# Patient Record
Sex: FEMALE | Race: WHITE | NOT HISPANIC OR LATINO
[De-identification: names, ages, dates, MRNs, and addresses within clinical notes are randomized per-mention and may not be internally consistent; named-entity substitution may affect disease eponyms.]

---

## 2019-04-24 ENCOUNTER — RX ONLY (RX ONLY)
Age: 84
End: 2019-04-24

## 2019-10-02 ENCOUNTER — APPOINTMENT (OUTPATIENT)
Age: 84
Setting detail: DERMATOLOGY
End: 2019-10-02

## 2019-10-02 VITALS
HEIGHT: 59 IN | WEIGHT: 112 LBS | HEART RATE: 64 BPM | DIASTOLIC BLOOD PRESSURE: 70 MMHG | SYSTOLIC BLOOD PRESSURE: 113 MMHG

## 2019-10-02 DIAGNOSIS — L20.89 OTHER ATOPIC DERMATITIS: ICD-10-CM

## 2019-10-02 PROCEDURE — OTHER COUNSELING: OTHER

## 2019-10-02 PROCEDURE — OTHER PRESCRIPTION MEDICATION MANAGEMENT: OTHER

## 2019-10-02 PROCEDURE — OTHER PLAN FOR BMI MANAGEMENT: OTHER

## 2019-10-02 PROCEDURE — OTHER MIPS QUALITY: OTHER

## 2019-10-02 PROCEDURE — 99214 OFFICE O/P EST MOD 30 MIN: CPT

## 2019-10-02 PROCEDURE — OTHER PRESCRIPTION: OTHER

## 2019-10-02 RX ORDER — HYDROCORTISONE 25 MG/G
CREAM TOPICAL
Qty: 1 | Refills: 2 | Status: ERX | COMMUNITY
Start: 2019-10-02

## 2019-10-02 RX ORDER — BETAMETHASONE DIPROPIONATE 0.5 MG/G
CREAM TOPICAL
Qty: 1 | Refills: 1 | Status: ERX

## 2019-10-02 ASSESSMENT — LOCATION SIMPLE DESCRIPTION DERM
LOCATION SIMPLE: LEFT INDEX FINGER
LOCATION SIMPLE: RIGHT POPLITEAL SKIN
LOCATION SIMPLE: RIGHT POSTERIOR UPPER ARM
LOCATION SIMPLE: RIGHT FOOT
LOCATION SIMPLE: LEFT FOOT
LOCATION SIMPLE: RIGHT CALF
LOCATION SIMPLE: LEFT HAND
LOCATION SIMPLE: RIGHT BREAST
LOCATION SIMPLE: RIGHT THIGH
LOCATION SIMPLE: LEFT THIGH
LOCATION SIMPLE: RIGHT KNEE
LOCATION SIMPLE: RIGHT HAND
LOCATION SIMPLE: LEFT MIDDLE FINGER

## 2019-10-02 ASSESSMENT — LOCATION DETAILED DESCRIPTION DERM
LOCATION DETAILED: RIGHT PROXIMAL POSTERIOR UPPER ARM
LOCATION DETAILED: RIGHT ANTERIOR DISTAL THIGH
LOCATION DETAILED: LEFT ANTERIOR DISTAL THIGH
LOCATION DETAILED: RIGHT PROXIMAL LATERAL CALF
LOCATION DETAILED: 2ND WEB SPACE RIGHT HAND
LOCATION DETAILED: RIGHT DORSAL FOOT
LOCATION DETAILED: RIGHT AREOLA
LOCATION DETAILED: RIGHT LATERAL POPLITEAL SKIN
LOCATION DETAILED: LEFT RADIAL DORSAL HAND
LOCATION DETAILED: LEFT INDEX FINGERTIP
LOCATION DETAILED: LEFT DORSAL FOOT
LOCATION DETAILED: RIGHT RADIAL DORSAL HAND
LOCATION DETAILED: LEFT MIDDLE FINGERTIP
LOCATION DETAILED: RIGHT KNEE

## 2019-10-02 ASSESSMENT — BSA ECZEMA: % BODY COVERED IN ECZEMA: 10

## 2019-10-02 ASSESSMENT — LOCATION ZONE DERM
LOCATION ZONE: LEG
LOCATION ZONE: ARM
LOCATION ZONE: TRUNK
LOCATION ZONE: HAND
LOCATION ZONE: FINGER
LOCATION ZONE: FEET

## 2019-10-02 ASSESSMENT — SEVERITY ASSESSMENT: SEVERITY: MODERATE

## 2019-10-02 NOTE — PROCEDURE: PRESCRIPTION MEDICATION MANAGEMENT
Discontinue Regimen: Discontinue all other treatments except Sarna, Vanicream
Initiate Treatment: Betamethasone Dipropionate augmented once daily
Samples Given: Vanicream, Sarna sensitive, eucerin eczema
Render In Strict Bullet Format?: Yes
Detail Level: Detailed

## 2019-10-02 NOTE — HPI: RASH
What Type Of Note Output Would You Prefer (Optional)?: Bullet Format
How Severe Is Your Rash?: severe
Is This A New Presentation, Or A Follow-Up?: Follow Up Rash
Additional History: History of eczema when she was a young girl.  States she did not have eczema as an adult until recently.

## 2019-10-02 NOTE — PROCEDURE: PLAN FOR BMI MANAGEMENT
EKG results are okay no sign of AFIB at this time should symptoms continue and become problematic for patient to consider doing an event monitor then.if has multiple episodes then ok to discontinue the even monitor after 2 weeks. Order placed in electronic medical record    Patient aware of message as above.will think about obtaining the event monitor.He is hoping once things \"settle down\" he will be ok.   Detail Level: Detailed

## 2019-10-02 NOTE — PROCEDURE: MIPS QUALITY
Quality 130: Documentation Of Current Medications In The Medical Record: Current Medications Documented
Quality 128: Preventive Care And Screening: Body Mass Index (Bmi) Screening And Follow-Up Plan: BMI is documented above normal parameters and a follow-up plan is documented
Quality 402: Tobacco Use And Help With Quitting Among Adolescents: Patient screened for tobacco and never smoked
Quality 111:Pneumonia Vaccination Status For Older Adults: Pneumococcal Vaccination Previously Received
Quality 110: Preventive Care And Screening: Influenza Immunization: Influenza Immunization previously received during influenza season
Quality 317: Preventative Care And Screening: Screening For High Blood Pressure And Follow-Up Documented: Pre-hypertensive or hypertensive blood pressure reading documented, indicated follow-up not documented, documentation the patient is not eligible
Detail Level: Detailed

## 2019-10-16 ENCOUNTER — APPOINTMENT (OUTPATIENT)
Age: 84
Setting detail: DERMATOLOGY
End: 2019-10-17

## 2019-10-16 VITALS
WEIGHT: 112 LBS | HEIGHT: 55 IN | DIASTOLIC BLOOD PRESSURE: 78 MMHG | HEART RATE: 62 BPM | SYSTOLIC BLOOD PRESSURE: 115 MMHG

## 2019-10-16 DIAGNOSIS — L30.8 OTHER SPECIFIED DERMATITIS: ICD-10-CM

## 2019-10-16 DIAGNOSIS — L259 CONTACT DERMATITIS AND OTHER ECZEMA, UNSPECIFIED CAUSE: ICD-10-CM

## 2019-10-16 PROCEDURE — OTHER TREATMENT REGIMEN: OTHER

## 2019-10-16 PROCEDURE — OTHER PLAN FOR BMI MANAGEMENT: OTHER

## 2019-10-16 PROCEDURE — OTHER MIPS QUALITY: OTHER

## 2019-10-16 PROCEDURE — 99213 OFFICE O/P EST LOW 20 MIN: CPT

## 2019-10-16 PROCEDURE — OTHER COUNSELING: OTHER

## 2019-10-16 RX ORDER — TACROLIMUS 1 MG/G
OINTMENT TOPICAL
Qty: 1 | Refills: 2 | Status: ERX

## 2019-10-16 ASSESSMENT — LOCATION ZONE DERM
LOCATION ZONE: FINGER
LOCATION ZONE: ARM
LOCATION ZONE: HAND
LOCATION ZONE: LEG

## 2019-10-16 ASSESSMENT — PAIN INTENSITY VAS: HOW INTENSE IS YOUR PAIN 0 BEING NO PAIN, 10 BEING THE MOST SEVERE PAIN POSSIBLE?: NO PAIN

## 2019-10-16 ASSESSMENT — BSA RASH: BSA RASH: 5

## 2019-10-16 ASSESSMENT — LOCATION SIMPLE DESCRIPTION DERM
LOCATION SIMPLE: LEFT MIDDLE FINGER
LOCATION SIMPLE: LEFT INDEX FINGER
LOCATION SIMPLE: LEFT POSTERIOR THIGH
LOCATION SIMPLE: LEFT HAND
LOCATION SIMPLE: RIGHT UPPER ARM
LOCATION SIMPLE: LEFT FOREARM
LOCATION SIMPLE: LEFT KNEE
LOCATION SIMPLE: LEFT THUMB
LOCATION SIMPLE: RIGHT KNEE
LOCATION SIMPLE: RIGHT HAND
LOCATION SIMPLE: RIGHT POSTERIOR THIGH
LOCATION SIMPLE: RIGHT ELBOW

## 2019-10-16 ASSESSMENT — SEVERITY ASSESSMENT: SEVERITY: MILD

## 2019-10-16 ASSESSMENT — LOCATION DETAILED DESCRIPTION DERM
LOCATION DETAILED: RIGHT DISTAL POSTERIOR THIGH
LOCATION DETAILED: RIGHT KNEE
LOCATION DETAILED: PERIUNGUAL SKIN LEFT THUMB
LOCATION DETAILED: LEFT INDEX FINGERTIP
LOCATION DETAILED: LEFT KNEE
LOCATION DETAILED: LEFT DISTAL POSTERIOR THIGH
LOCATION DETAILED: RIGHT ANTERIOR PROXIMAL UPPER ARM
LOCATION DETAILED: RIGHT ELBOW
LOCATION DETAILED: LEFT PROXIMAL RADIAL DORSAL FOREARM
LOCATION DETAILED: LEFT THENAR EMINENCE
LOCATION DETAILED: RIGHT THENAR EMINENCE
LOCATION DETAILED: LEFT MIDDLE FINGERTIP
LOCATION DETAILED: LEFT VENTRAL PROXIMAL FOREARM

## 2019-10-16 NOTE — HPI: RASH (ECZEMA)
How Severe Is Your Eczema?: moderate
Is This A New Presentation, Or A Follow-Up?: Follow Up Eczema
Additional History: -Her eczema is better on the body and face but not on her fingertips.\\n-Her daughter and her  believe the fingertip lesions are something different than eczema.  They look different from her other lesions.  They look different from the lifelong eczema lesions of the daughter has had.  They have not responded to medications.  They are painful, blister and crack.

## 2019-10-16 NOTE — PROCEDURE: MIPS QUALITY
Detail Level: Detailed
Quality 317: Preventative Care And Screening: Screening For High Blood Pressure And Follow-Up Documented: Pre-hypertensive or hypertensive blood pressure reading documented, indicated follow-up not documented, documentation the patient is not eligible
Quality 130: Documentation Of Current Medications In The Medical Record: Current Medications Documented
Quality 402: Tobacco Use And Help With Quitting Among Adolescents: Patient screened for tobacco and is an ex-smoker
Quality 111:Pneumonia Vaccination Status For Older Adults: Pneumococcal Vaccination Previously Received
Quality 110: Preventive Care And Screening: Influenza Immunization: Influenza Immunization Administered during Influenza season
Quality 128: Preventive Care And Screening: Body Mass Index (Bmi) Screening And Follow-Up Plan: BMI is documented above normal parameters and a follow-up plan is documented

## 2019-10-16 NOTE — PROCEDURE: TREATMENT REGIMEN
Initiate Treatment: Tacrolimus ointment 0.1% twice daily to the fingertip lesions and also other lesions as needed.  They may use this when she is not able to use the topical Steroids.  We discussed this.
Detail Level: Simple
Continue Regimen: Betamethasone Augmented 0.05% Cream 2x day up to two weeks then take a week off. Repeat as needed.  Hydrocortisone 2.5% Cream 2x day up to 2 weeks then take a week off. Repeat as needed.

## 2019-10-17 ENCOUNTER — RX ONLY (RX ONLY)
Age: 84
End: 2019-10-17

## 2019-10-17 RX ORDER — PIMECROLIMUS 10 MG/G
CREAM TOPICAL
Qty: 1 | Refills: 0 | Status: ERX | COMMUNITY
Start: 2019-10-17

## 2019-11-13 ENCOUNTER — APPOINTMENT (OUTPATIENT)
Age: 84
Setting detail: DERMATOLOGY
End: 2019-11-13

## 2019-11-13 ENCOUNTER — RX ONLY (RX ONLY)
Age: 84
End: 2019-11-13

## 2019-11-13 VITALS
HEIGHT: 55 IN | SYSTOLIC BLOOD PRESSURE: 117 MMHG | HEART RATE: 62 BPM | WEIGHT: 112 LBS | DIASTOLIC BLOOD PRESSURE: 75 MMHG

## 2019-11-13 DIAGNOSIS — L259 CONTACT DERMATITIS AND OTHER ECZEMA, UNSPECIFIED CAUSE: ICD-10-CM

## 2019-11-13 DIAGNOSIS — L30.8 OTHER SPECIFIED DERMATITIS: ICD-10-CM

## 2019-11-13 DIAGNOSIS — L85.3 XEROSIS CUTIS: ICD-10-CM

## 2019-11-13 PROCEDURE — OTHER MIPS QUALITY: OTHER

## 2019-11-13 PROCEDURE — OTHER TREATMENT REGIMEN: OTHER

## 2019-11-13 PROCEDURE — 99214 OFFICE O/P EST MOD 30 MIN: CPT

## 2019-11-13 PROCEDURE — OTHER PRESCRIPTION MEDICATION MANAGEMENT: OTHER

## 2019-11-13 PROCEDURE — OTHER COUNSELING: OTHER

## 2019-11-13 PROCEDURE — OTHER PLAN FOR BMI MANAGEMENT: OTHER

## 2019-11-13 RX ORDER — PIMECROLIMUS 10 MG/G
CREAM TOPICAL
Qty: 1 | Refills: 3 | Status: ERX

## 2019-11-13 RX ORDER — BETAMETHASONE DIPROPIONATE 0.5 MG/G
CREAM TOPICAL
Qty: 1 | Refills: 3 | Status: ERX

## 2019-11-13 RX ORDER — HYDROCORTISONE 25 MG/G
CREAM TOPICAL
Qty: 1 | Refills: 3 | Status: ERX

## 2019-11-13 ASSESSMENT — LOCATION ZONE DERM
LOCATION ZONE: LEG
LOCATION ZONE: ARM
LOCATION ZONE: FINGER
LOCATION ZONE: TRUNK

## 2019-11-13 ASSESSMENT — LOCATION DETAILED DESCRIPTION DERM
LOCATION DETAILED: LEFT PROXIMAL PRETIBIAL REGION
LOCATION DETAILED: LEFT PROXIMAL DORSAL FOREARM
LOCATION DETAILED: LEFT RING FINGERTIP
LOCATION DETAILED: LEFT DISTAL POSTERIOR THIGH
LOCATION DETAILED: RIGHT AREOLA
LOCATION DETAILED: RIGHT SUPERIOR UPPER BACK
LOCATION DETAILED: RIGHT INDEX FINGERTIP
LOCATION DETAILED: LEFT VENTRAL DISTAL FOREARM
LOCATION DETAILED: LEFT SMALL FINGERTIP
LOCATION DETAILED: RIGHT MIDDLE FINGERTIP
LOCATION DETAILED: RIGHT DISTAL DORSAL FOREARM
LOCATION DETAILED: LEFT MIDDLE FINGERTIP
LOCATION DETAILED: RIGHT PROXIMAL PRETIBIAL REGION
LOCATION DETAILED: PERIUNGUAL SKIN LEFT THUMB
LOCATION DETAILED: RIGHT SMALL FINGERTIP
LOCATION DETAILED: RIGHT DISTAL POSTERIOR THIGH
LOCATION DETAILED: LEFT SUPERIOR UPPER BACK
LOCATION DETAILED: RIGHT RING FINGERTIP
LOCATION DETAILED: LEFT PROXIMAL CALF
LOCATION DETAILED: LEFT INDEX FINGERTIP
LOCATION DETAILED: RIGHT DISTAL RADIAL THUMB

## 2019-11-13 ASSESSMENT — LOCATION SIMPLE DESCRIPTION DERM
LOCATION SIMPLE: LEFT MIDDLE FINGER
LOCATION SIMPLE: LEFT PRETIBIAL REGION
LOCATION SIMPLE: RIGHT UPPER BACK
LOCATION SIMPLE: RIGHT THUMB
LOCATION SIMPLE: RIGHT POSTERIOR THIGH
LOCATION SIMPLE: LEFT POSTERIOR THIGH
LOCATION SIMPLE: RIGHT FOREARM
LOCATION SIMPLE: LEFT SMALL FINGER
LOCATION SIMPLE: RIGHT SMALL FINGER
LOCATION SIMPLE: RIGHT MIDDLE FINGER
LOCATION SIMPLE: LEFT THUMB
LOCATION SIMPLE: RIGHT INDEX FINGER
LOCATION SIMPLE: RIGHT RING FINGER
LOCATION SIMPLE: RIGHT PRETIBIAL REGION
LOCATION SIMPLE: LEFT INDEX FINGER
LOCATION SIMPLE: RIGHT BREAST
LOCATION SIMPLE: LEFT RING FINGER
LOCATION SIMPLE: LEFT FOREARM
LOCATION SIMPLE: LEFT CALF
LOCATION SIMPLE: LEFT UPPER BACK

## 2019-11-13 ASSESSMENT — PAIN INTENSITY VAS: HOW INTENSE IS YOUR PAIN 0 BEING NO PAIN, 10 BEING THE MOST SEVERE PAIN POSSIBLE?: 3/10 PAIN

## 2019-11-13 ASSESSMENT — BSA RASH: BSA RASH: 2

## 2019-11-13 ASSESSMENT — SEVERITY ASSESSMENT: SEVERITY: MODERATE

## 2019-11-13 NOTE — PROCEDURE: PRESCRIPTION MEDICATION MANAGEMENT
Detail Level: Detailed
Render In Strict Bullet Format?: Yes
Continue Regimen: -Betamethasone augmented Dipropionate-Apply at bedtime as needed. No more than 2 weeks if used continuously. 1 week off. Repeat as needed.  \\n-Pimecrolimus cream twice daily on the weeks that she does not use the Betamethasone

## 2019-11-13 NOTE — PROCEDURE: TREATMENT REGIMEN
Action 1: Continue
Detail Level: Zone
Continue Regimen: Betamethasone augmented cream for the body.  Hydrocortisone 2.5% cream for the face and nipples.  Pimecrolimus 1% cream for the week off of the Betamethasone cream.

## 2019-11-13 NOTE — PROCEDURE: MIPS QUALITY
Detail Level: Detailed
Quality 402: Tobacco Use And Help With Quitting Among Adolescents: Patient screened for tobacco and is an ex-smoker
Quality 317: Preventative Care And Screening: Screening For High Blood Pressure And Follow-Up Documented: Pre-hypertensive or hypertensive blood pressure reading documented, indicated follow-up not documented, documentation the patient is not eligible
Quality 130: Documentation Of Current Medications In The Medical Record: Current Medications Documented
Quality 110: Preventive Care And Screening: Influenza Immunization: Influenza Immunization Administered during Influenza season
Quality 128: Preventive Care And Screening: Body Mass Index (Bmi) Screening And Follow-Up Plan: BMI is documented above normal parameters and a follow-up plan is documented
Quality 111:Pneumonia Vaccination Status For Older Adults: Pneumococcal Vaccination Previously Received

## 2020-01-01 ENCOUNTER — PROCEDURE (OUTPATIENT)
Dept: URBAN - METROPOLITAN AREA CLINIC 54 | Facility: CLINIC | Age: 85
End: 2020-01-01
Payer: MEDICARE

## 2020-01-01 DIAGNOSIS — Z96.1 PRESENCE OF PSEUDOPHAKIA: ICD-10-CM

## 2020-01-01 DIAGNOSIS — H35.3113 NEXDTVE AGE-REL MCLR DEGN, R EYE, ADV ATRPC W/O SBFVL INVL: ICD-10-CM

## 2020-01-01 PROCEDURE — 92014 COMPRE OPH EXAM EST PT 1/>: CPT | Performed by: OPHTHALMOLOGY

## 2020-01-01 PROCEDURE — 92134 CPTRZ OPH DX IMG PST SGM RTA: CPT | Performed by: OPHTHALMOLOGY

## 2020-01-01 PROCEDURE — 67028 INJECTION EYE DRUG: CPT | Performed by: OPHTHALMOLOGY

## 2020-01-01 ASSESSMENT — INTRAOCULAR PRESSURE
OD: 10
OD: 13
OS: 18
OS: 12

## 2020-01-21 RX ORDER — BETAMETHASONE DIPROPIONATE 0.5 MG/G
CREAM TOPICAL
Qty: 1 | Refills: 3 | Status: ERX

## 2020-01-21 RX ORDER — HYDROCORTISONE 25 MG/G
CREAM TOPICAL
Qty: 1 | Refills: 3 | Status: ERX

## 2020-10-29 NOTE — IMPRESSION/PLAN
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 Plan: Stable upon examination. The patient was advised to continue ARED's. Smoking cessation was advised. The patient was also advised to continue to monitor AG and VA and call immediately with any changes.

## 2020-10-29 NOTE — IMPRESSION/PLAN
Impression: Exdtve age-rel mclr degn, left eye, with actv chrdl neovas: H35.3221 OS.
-s/p Eylea 9/17/20 OCT: 10/30/20 OD: drusen OS: drusen, PED, CME- Plan: Improved response to Eylea. Based on exam and OCT, repeat injection is recommended to stabilize CNVM activity.  Rec continue Eylea OS


RTC 6 weeks, Eylea OS #2/3

## 2021-01-01 ENCOUNTER — PROCEDURE (OUTPATIENT)
Dept: URBAN - METROPOLITAN AREA CLINIC 54 | Facility: CLINIC | Age: 86
End: 2021-01-01
Payer: MEDICARE

## 2021-01-01 DIAGNOSIS — H35.3221 EXUDATIVE AGE-RELATED MACULAR DEGENERATION, LEFT EYE, WITH ACTIVE CHOROIDAL NEOVASCULARIZATION: Primary | ICD-10-CM

## 2021-01-01 PROCEDURE — 67028 INJECTION EYE DRUG: CPT | Performed by: OPHTHALMOLOGY

## 2021-01-01 ASSESSMENT — INTRAOCULAR PRESSURE
OS: 14
OD: 13

## 2024-08-07 NOTE — IMPRESSION/PLAN
2024       Srinivas Lora MD  [address] [place] [id number]  Via In Basket      Patient: Shirin Waite   YOB: 1965   Date of Visit: 2024       Dear Dr. Lora:    I saw your patient, Shirin Waite, for an evaluation. Below are my notes for this visit with her.    If you have questions, please do not hesitate to call me.      Sincerely,        Katie Burkett MD        CC: Katie Madden MD  2024  9:43 AM  Signed  RHEUMATOLOGY FOLLOW UP VISIT    2024  Patient Name: Shirin Waite  : 1965  Medical Record: [id number]    CHIEF COMPLAINT:   Chief Complaint   Patient presents with   • Follow-up     CMD, Positive GETACHEW,  Body stiffness and pain.         HISTORY OF PRESENT ILLNESS  Shirin Waite is a 59 year old female who is being seen for follow up evaluation of Connective tissue disease    Has not done DEXA scan yet    Joints involved: Hands, low back  AM stiffness: Minimal  Flares: Occasional  Medications used:  Mg daily, last eye exam was 2024, prednisone 5 mg daily  Relieving factors: Rest  Worsening factors: Activity  Associated symptoms: No  Difficulty with ADLs: Sometimes  Pain level today: Variable    Denies skin rash, oral or nasal ulcer, fever, chills, nausea, diarrhea    She was previously diagnosed with lipoma of the right arm.  It has not become painful and she would like a referral to surgery    Past Medical History:   Diagnosis Date   • Colon polyp    • Depression    • Hyperthyroidism    • Malignant neoplasm of right breast  (CMD)     had mastectomy and radiation   • Memory loss    • Onychomycosis    • Rheumatoid arthritis  (CMD)    • Sjogren's syndrome  (CMD)      Past Surgical History:   Procedure Laterality Date   • Esophagogastroduodenoscopy (egd)  2022   • Mastectomy Right    • Tubal ligation      postpartum     Social History     Socioeconomic History   • Marital  Impression: Presence of pseudophakia: Z96.1 OU.  Plan: Stable status: /Civil Union     Spouse name: Not on file   • Number of children: Not on file   • Years of education: Not on file   • Highest education level: Not on file   Occupational History   • Not on file   Tobacco Use   • Smoking status: Never   • Smokeless tobacco: Never   Vaping Use   • Vaping status: never used   Substance and Sexual Activity   • Alcohol use: No   • Drug use: Yes     Types: Prescription Drugs   • Sexual activity: Not on file   Other Topics Concern   • Not on file   Social History Narrative   • Not on file     Social Determinants of Health     Financial Resource Strain: Low Risk  (7/6/2021)    Received from [facility]    Overall Financial Resource Strain (CARDIA)    • Difficulty of Paying Living Expenses: Not hard at all   Food Insecurity: No Food Insecurity (11/9/2021)    Received from [facility]    Food Insecurity    • Within the past 30 days, I worried whether my food would run out before I got money to buy more. / En los últimos 30 días, me preocupó que la comida se podía acabar antes de tener dinero para compr...: Never true / Nunca    • Within the past 30 days, the food that I bought just didn't last, and I didn't have money to get more. / En los últimos 30 días, La comida que compré no rindió lo suficiente, y no tenía dinero para...: Never true / Nunca   Transportation Needs: Not on file   Physical Activity: Not on file   Stress: Not on file   Social Connections: Not on file   Interpersonal Safety: Unknown (10/25/2022)    Received from [facility]    Intimate Partner Violence    • Feels Safe at Home: Not on file     Family History   Problem Relation Age of Onset   • Cancer, Esophageal Father    • Cancer Sister         breast   • Cancer Maternal Aunt         breast   • Cancer, Colon Neg Hx    • Cancer, Liver Neg Hx    • Cancer, Rectal Neg Hx    • Cancer, Stomach Neg Hx            MEDICATIONS  Current Outpatient Medications    Medication Sig Dispense Refill   • hydroxychloroquine (PLAQUENIL) 200 MG tablet TAKE 1 TABLET BY MOUTH IN THE MORNING AND IN THE EVENING 180 tablet 0   • predniSONE (DELTASONE) 5 MG tablet TAKE 1 TABLET BY MOUTH DAILY 90 tablet 0   • methIMAzole (TAPAZOLE) 5 MG tablet Take 1 tablet by mouth daily. 90 tablet 0   • gabapentin (NEURONTIN) 300 MG capsule Take 1 capsule by mouth in the morning and 1 capsule at noon and 1 capsule in the evening. 90 capsule 5   • traMADol (ULTRAM) 50 MG tablet 4 times daily.     • Calcium Carb-Cholecalciferol 250-3.125 MG-MCG Tab Take 1 tablet by mouth.     • Paxlovid, 300/100, 20 x 150 MG & 10 x 100MG Tablet Therapy Pack  (Patient not taking: Reported on 8/7/2024)     • traMADol (ULTRAM) 50 MG tablet Take 1 tablet by mouth every 8 hours as needed for Pain. 90 tablet 0   • Melatonin 5 MG Cap Take 5 mg by mouth as needed.      • omeprazole (PrilOSEC) 20 MG capsule Take 20 mg by mouth daily.     • acetaminophen (TYLENOL) 500 MG tablet Take 500 mg by mouth every 4 hours as needed.      • sodium chloride (OCEAN) 0.65 % nasal spray Spray 1 spray in each nostril as needed.      • B Complex Vitamins (VITAMIN B COMPLEX PO)      • Calcium Citrate-Vitamin D (CALCIUM + D PO) 1 tablet daily.      • Loratadine (CLARITIN PO) Take 10 mg by mouth as needed.      • DICLOFENAC SODIUM EX as needed.      • folic acid (FOLATE) 1 MG tablet Take 1 mg by mouth as needed.  (Patient not taking: Reported on 8/7/2024)       No current facility-administered medications for this visit.       ALLERGIES  ALLERGIES:   Allergen Reactions   • Ciprofloxacin HIVES, DIARRHEA and Other (See Comments)   • Latex RASH         Review of Systems   Musculoskeletal:  Positive for arthralgias.   Neurological:  Positive for numbness.          PHYSICAL EXAM   Vitals:    08/07/24 0909   BP: 124/80   Pulse: 80   Temp: 98.1 °F (36.7 °C)   TempSrc: Temporal   Weight: 81.6 kg (180 lb)   Height: 5' 8\" (1.727 m)       General appearance: Pt  appears well developed, well nourished with attention to grooming  Skin: No rash. No induration.  Callus noted on left sole  Eyes: Conjunctiva and lids moist and without inflammation, no icterus noted  ENT: no ulcers, no erythema  Respiratory: Clear to auscultation, no wheezes or rhonchi, normal respiratory effort   Cardiovascular: Regular rate and rhythm, no murmurs rubs or gallops    Musculoskeletal:    No synovitis noted in the peripheral joints of upper or lower extremity.  She has correctable swan-neck deformity of bilateral fifth DIP joint.  There is bony hypertrophy of DIP joints.  There is swelling of the right upper arm about 4 x 3 cm in size consistent with lipoma, positive slip sign     Neuro: A&Ox4. no gross motor or sensory defects  Psychiatric: Mood and affect are appropriate        LABS:  Component      Latest Ref Rng 12/28/2023  10:31 AM   Sodium      135 - 145 mmol/L 143    Potassium      3.4 - 5.1 mmol/L 4.1    Chloride      97 - 110 mmol/L 108    CO2      21 - 32 mmol/L 30    ANION GAP      7 - 19 mmol/L 9    Glucose      70 - 99 mg/dL 65 (L)    BUN      6 - 20 mg/dL 12    Creatinine      0.51 - 0.95 mg/dL 0.76    Glomerular Filtration Rate      >=60  >90    BUN/CREATININE RATIO      7 - 25  16    CALCIUM      8.4 - 10.2 mg/dL 9.3    TOTAL BILIRUBIN      0.2 - 1.0 mg/dL 0.7    AST/SGOT      <=37 Units/L 15    ALT/SGPT      <64 Units/L 20    ALK PHOSPHATASE      45 - 117 Units/L 80    Albumin      3.6 - 5.1 g/dL 4.1    TOTAL PROTEIN      6.4 - 8.2 g/dL 7.3    GLOBULIN      2.0 - 4.0 g/dL 3.2    A/G Ratio, Serum      1.0 - 2.4  1.3    COLOR Straw    CLARITY Clear    GLUCOSE(URINE)      Negative mg/dL Negative    BILIRUBIN      Negative  Negative    KETONES      Negative mg/dL Negative    SPECIFIC GRAVITY      1.005 - 1.030  1.009    BLOOD      Negative  Negative    pH      5.0 - 7.0  7.0    PROTEIN(URINE)      Negative mg/dL Negative    UROBILINOGEN      0.2, 1.0 mg/dL 0.2    NITRITE      Negative   Negative    LEUKOCYTE ESTERASE      Negative  Negative    WBC      4.2 - 11.0 K/mcL 3.8 (L)    RBC      4.00 - 5.20 mil/mcL 3.69 (L)    HGB      12.0 - 15.5 g/dL 11.7 (L)    HCT      36.0 - 46.5 % 36.5    MCV      78.0 - 100.0 fl 98.9    MCH      26.0 - 34.0 pg 31.7    MCHC      32.0 - 36.5 g/dL 32.1    PLT      140 - 450 K/mcL 202    RDW-CV      11.0 - 15.0 % 12.6    RDW-SD      39.0 - 50.0 fL 45.8    NRBC      <=0 /100 WBC 0    PROTEIN, URINE (TOTAL)      <12 mg/dL 8    CREATININE, URINE (TOTAL)      mg/dL 37.80    PROTEIN/CREATININE RATIO      <=199 mgPR/gCR 212 (H)    ESR      0 - 20 mm/hr 9    DOUBLE STRANDED DNA ANTIBODY      <=9 IUnits/mL <1    COMPLEMENT C3      90 - 180 mg/dL 100    COMPLEMENT C4      10.0 - 40.0 mg/dL 26.7    C-REACTIVE PROTEIN      <=1.0 mg/dL <0.3       Legend:  (L) Low  (H) High    IMAGING:  X-rays March 2022  Knees-mild/moderate degenerative arthropathy        Assessment and plan  #Sjogren's syndrome [positive GETACHEW,Positive SSA]  #positive RNP  #Neuropathy  #History of hyperthyroidism on methimazole  #Long-term hydroxychloroquine use  #Long-term use of steroids  #Lipoma      RECOMMENDATIONS  -Continue hydroxychloroquine 400 mg daily  -Continue prednisone 5 Mg daily  -General Surgery referral provided for evaluation and treatment of lipoma of the right upper extremity  -Check disease activity labs  -Check CK and vitamin D      RTC -6 months    The patient indicates understanding of these issues and agrees with the plan.  All of patients questions were answered    Katie Burkett MD, [facility]      This note was created using the Dragon voice recognition system. Errors in content may be related to improper recognition of the system. Effort to review and correct the note has been made but irregularities may still be present.